# Patient Record
Sex: MALE | Race: WHITE | Employment: OTHER | URBAN - METROPOLITAN AREA
[De-identification: names, ages, dates, MRNs, and addresses within clinical notes are randomized per-mention and may not be internally consistent; named-entity substitution may affect disease eponyms.]

---

## 2017-11-08 ENCOUNTER — HOSPITAL ENCOUNTER (OUTPATIENT)
Dept: GENERAL RADIOLOGY | Age: 82
Discharge: HOME | End: 2017-11-08
Payer: MEDICARE

## 2017-11-08 DIAGNOSIS — M54.50 LOW BACK PAIN: ICD-10-CM

## 2017-11-08 PROCEDURE — 72100 X-RAY EXAM L-S SPINE 2/3 VWS: CPT

## 2017-11-15 ENCOUNTER — HOSPITAL ENCOUNTER (OUTPATIENT)
Dept: CT IMAGING | Age: 82
Discharge: HOME | End: 2017-11-15
Attending: NURSE PRACTITIONER
Payer: MEDICARE

## 2017-11-15 DIAGNOSIS — M54.9 BACK PAIN: ICD-10-CM

## 2017-11-15 PROCEDURE — 72131 CT LUMBAR SPINE W/O DYE: CPT

## 2017-11-29 ENCOUNTER — HOSPITAL ENCOUNTER (OUTPATIENT)
Dept: PHYSICAL THERAPY | Age: 82
Discharge: HOME | End: 2017-11-29
Payer: MEDICARE

## 2017-11-29 PROCEDURE — 97530 THERAPEUTIC ACTIVITIES: CPT

## 2017-11-29 PROCEDURE — 97163 PT EVAL HIGH COMPLEX 45 MIN: CPT

## 2017-11-29 PROCEDURE — G8990 OTHER PT/OT CURRENT STATUS: HCPCS

## 2017-11-29 PROCEDURE — G8991 OTHER PT/OT GOAL STATUS: HCPCS

## 2017-11-29 NOTE — PROGRESS NOTES
PT DAILY TREATMENT NOTE     Patient Name: Miguel Angel Eid  Date:2017  : 1923  [x]  Patient  Verified  Payor: [place] MEDICARE / Plan: [place] MEDICARE PART A & B / Product Type: Medicare /    In time: 1200  Out time:1300  Total Treatment Time (min): 60    Medicare/Medicaid Total Timed Codes (min): 15  1:1 Treatment Time (min): 15     Visit #: 1 of 12    Treatment Area: Low back pain [M54.5]    SUBJECTIVE  Any medication changes, allergies to medications, adverse drug reactions, diagnosis change, or new procedure performed?: [x] No    [] Yes (see summary sheet for update)  Subjective functional status/changes:       Chief Complaint: 81 yo WM with CC low back pain X 3 wks s/p fall. Bent over to  milk container, and fell over. Denies any sort of vertigo, hypotensive light-headed. Presents with rollator, accompanied by daughter-in-law who cares for pt and pt's wife. Pt is legally blind and very [place] but AO x 3 when spoken to loudly and follows 1 and 2 step commands without difficulty, apx 50% with 3 step commands. Mechanism of injury: fall    Symptoms:  Pain rating (0-10): Today: 5   Best: 5   Worst: 7   [] Paresthesias: denies     [] Constant:   [] Intermittent:     Aggravated by:   [] Bending [] Sitting [x] Standing [x] Walking   [] Moving [] Cough [] Sneeze [] Valsalva   [x] AM  [] PM  Lying:  [] sup   [] pro   [] sidelying   [] Other:     Eased by:    [] Bending [x] Sitting [] Standing [] Walking   [] Moving [] AM  [] PM  Lying: [] sup  [] pro  [] sidelying   [] Other:    General Health:  Red Flags Indicated? [] Yes    [x] No  [] Yes [] No Recent weight change (If yes, due to dieting?  [] Yes  [] No)   [] Yes [] No Weakness in legs during walking  [] Yes [] No Unremitting pain at night  [] Yes [] No Abdominal pain or problems  [] Yes [] No Rectal bleeding  [] Yes [] No Feet more cold or painful in cold weather  [] Yes [] No Menstrual irregularities  [] Yes [] No Blood or pain with urination  [] Yes [] No Dysfunction of bowel or bladder  [] Yes [] No Recent illness within past 3 weeks (i.e, cold, flu)  [] Yes [] No Numbness/tingling in buttock/genitalia region     PMH/PSH: see record    Diagnostic Tests: [] X-rays     [] MRI  [x] CT       [] Other:  Results: + nondisplaced fxs of L 12th post rib, L1-l4 transverse processes. Myofascial paramedian soft tissue lesion from T12-L2: dimensions 8.4 x 3.3 x 7 cm. Functional status:  Occupation/ hobbies: retired. Regularly goes to group exercise, single wt lifting, and swimming in pool   Prior level of function:    independent with ADLs   sleep for 7 hours/night without disruption due to pain   participate in personal fitness program 4-5 x/wk for apx 60 min/session. Present functional limitations:    unable to reach overhead without pain   unable to walk without pain   unable to dress without pain   unable to participate in personal fitness program without pain   difficulty sleeping   Pain with bed mobility   Pain with sit<>stand Xfers. What position do you sleep in? Supine + sidelying    OBJECTIVE  FOTO:    Initial Evaluation score: 28   Predicted DC score:  48     Posture:  Head  [] normal         [x] forward            [] lateral shift         [] sidebend/tilt   Cervical lordosis  [] normal         [] increased         [x] decreased   Thoracic kyphosis  [] normal         [x] increased         [] decreased   Lumbar lordosis  [] normal         [] increased         [x] decreased     Palpation  [] Min  [x] Mod  [] Severe    Location: paraspinals  [] Min  [x] Mod  [] Severe    Location: T-L spine midline  [] Min  [x] Mod  [] Severe    Location: soft tissue protrusion due to myofascial tear    Swollen nodule on back: SUP-INF= 10.5 cm. Med-Lat = 11 cm. Largest diagonal = 14 cm. ** Monitor for progression as this is larger than dimensions noted on CT scan.     Trunk  AROM % Pain   Flexion 50 [x]   Extension 20 [x]   Rotation - L 40 [x] Rotation - R 40 [x]   Sidebending - L 70 [x]   Sidebending - R 70 [x]       Trunk MMT Pain   Flexion 2 [x]   Extension 2 [x]   Rotation - L 2 [x]   Rotation - R 2 [x]   Sidebending - L -- [x]   Sidebending - R -- [x]       Neurological Screen:     Myotomes Left Right Pain   Hip flexion  (L2) 4 4 []   Knee extension  (L3) 4 4 []   Ankle dorsiflexion  (L4) 4 4 []   Great toe extension  (L5) 4 4 []   Ankle plantarflexion  (S1) 4 4 []   Knee flexion  (S2) 4 4 []       Sensation  [x] normal         [] increased         [] decreased   Reflexes:    Patellar (L3-4) - L  [] 0      [x] 1      [] 2       [] 3         [] 4     Patellar - R  [] 0      [x] 1      [] 2       [] 3         [] 4     Achilles (S1-2) - L  [] 0      [] 1      [] 2       [] 3         [] 4     Achilles - R  [] 0      [] 1      [] 2       [] 3         [] 4         Special Tests: deferred due to pain and + CT  Bed Mobility and Xfers: ModA  Gait: [place] with rollator. TODAY'S TREATMENT:    Therapeutic Procedures:  Min Procedure Specifics + Rationale    [] Therapeutic Exercise   []  See Flowsheet   Rationale:    15 [x] Therapeutic Activity   [x]  See Flowsheet  Rationale: Increase ROM, strength, and balance to increase safety with bed mobility and Xfers    [] Neuromuscular Re-ed   []  See Flowsheet  Rationale:     []  Manual Therapy     [] STM/DTM     [] IASTM     [] Scar Massage  [] X-friction       [] PNF         [] PROM  [] Soft tissue mobz:   [] Joint mobz:   Rationale:     []  Gait Training     [x]  Gait belt needed [] Treadmill: Speed: ___. Grade: ___. Time: ___  [] Gym: Distance: ___   Device:  [] SC [] QC [] AC   [] FC   [] SW   [] RW   Assist:   [] SB [] CG  [] Min  [] Mod [] Max  Rationale:     [x]  Patient Education [x] Issued HEP    [] Progressed/Changed HEP based on:   [] positioning   [] body mechanics     [] transfers      [] heat/ice application       Other Objective/Functional Measures:  See intake/chart.      Pain Level (0-10 scale) post treatment: 5    ASSESSMENT/Changes in Function: Pt with significantly impaired ROM, strength, balance, functional mobility and overall functional capacity. Eval Complexity:   History: HIGH Complexity :3+ comorbidities / personal factors will impact the outcome/ POC   Exam:HIGH Complexity : 4+ Standardized tests and measures addressing body structure, function, activity limitation and / or participation in recreation    Presentation: HIGH Complexity : Unstable and unpredictable characteristics   Clinical Decision Making:HIGH Complexity : FOTO score of 1- 25   Overall Complexity:HIGH     Patient will continue to benefit from skilled PT services to modify and progress therapeutic interventions, address functional mobility deficits, address ROM deficits, address strength deficits, analyze and address soft tissue restrictions, analyze and cue movement patterns, analyze and modify body mechanics/ergonomics, assess and modify postural abnormalities, address imbalance/dizziness and instruct in home and community integration to attain remaining goals. [x]  See Plan of Care  []  See Progress Note/ Recertification  []  See Discharge Summary         Progress towards goals / Updated goals:  STGs- 4 wks - 12/27/17  1. Pt and caregiver will be safe and independent with HEP for participation in his rehabilitation. 2. Pt will perform bed mobility with Jose Carlos  3. Pt will perform sit<>stand and stand<>pivot Xfers with Jose Carlos  LTGs- 12 wks - 2/21/18  1. Pt will be SBA with rollator to return to personal exercise program  2. Pt will be safe and indep with bed mobility to assist in his own care. 3. Pt will be safe and indep with Xfers to assist in his own care. 4. Pt will score > 48 on FOTO functional status survey representing overall functional improvements.     PLAN  []  Upgrade activities as tolerated     [x]  Continue plan of care  []  Update interventions per flow sheet       []  Discharge due to:_  []  Other:_ Therapist:  Jose Johnson, PT Date:  11/29/2017  Time: 12:12 PM    Future Appointments  Date Time Provider Medhat Portillo   3/22/2018 11:00 AM [facility] NURSE [facility] NICOLETTE HART   9/19/2018 11:15 AM Brunilda Manrique MD [address] [place] [address]

## 2017-11-29 NOTE — PROGRESS NOTES
[address] [facility] PHYSICAL THERAPY  [address] [facility] [address], [address]  Phone: [phone]  Fax: [phone] / [address] [facility]  Patient Name: Renny Tyler : 1923   Medical   Diagnosis: Low back pain [M54.5] Treatment Diagnosis: LBP. Difficulty walking. Onset Date: 17     Referral Source: Shereen Milton MD Start of Care [facility]): 2017   Prior Hospitalization: See medical history Provider #: [id number]   Prior Level of Function: Modified independent with rollator. Gym program at [facility] 4x/wk. Comorbidities: Fx, visually impaired, [place], OA, [place] , dowager's hump, COPD, glaucoma   Medications: Verified on Patient Summary List     The Plan of Care and following information is based on the information from the initial evaluation. SUBJECTIVE  81 yo WM with CC low back pain X 3 wks s/p fall. Bent over to  milk container, and fell over. Denies any sort of vertigo, hypotensive light-headed. Presents with rollator, accompanied by daughter-in-law who cares for pt and pt's wife. Pt is legally blind and very [place] but AO x 3 when spoken to loudly and follows 1 and 2 step commands without difficulty, apx 50% with 3 step commands.       OBJECTIVE  Swollen nodule on back: SUP-INF= 10.5 cm. Med-Lat = 11 cm.  Largest diagonal = 14 cm. ** Monitor for progression as this is larger than dimensions noted on CT scan.     Trunk  AROM % Pain   Flexion 50 [x]   Extension 20 [x]   Rotation - L 40 [x]   Rotation - R 40 [x]   Sidebending - L 70 [x]   Sidebending - R 70 [x]         Trunk MMT Pain   Flexion 2 [x]   Extension 2 [x]   Rotation - L 2 [x]   Rotation - R 2 [x]   Sidebending - L -- [x]   Sidebending - R -- [x]         Neurological Screen:      Myotomes Left Right Pain   Hip flexion  (L2) 3+ 3+ [x]   Knee extension  (L3) 4 4 []   Ankle dorsiflexion  (L4) 4 4 []   Great toe extension  (L5) 4 4 []   Ankle plantarflexion  (S1) 4 4 []   Knee flexion  (S2) 4 4 []        ASSESSMENT+ PLAN  Pt presents with decreased ROM, strength, functional mobility, functional capacity, loss of independence with basic ADLs and self-care. Eval Complexity:   History: HIGH Complexity :3+ comorbidities / personal factors will impact the outcome/ POC   Exam:HIGH Complexity : 4+ Standardized tests and measures addressing body structure, function, activity limitation and / or participation in recreation    Presentation: HIGH Complexity : Unstable and unpredictable characteristics   Clinical Decision Making:HIGH Complexity : FOTO score of 1- 25   Overall Complexity:HIGH     Problem List: pain affecting function, decrease ROM, decrease strength, impaired gait/ balance, decrease ADL/ functional abilitiies, decrease activity tolerance and decrease flexibility/ joint mobility   Treatment Plan may include any combination of the following: Therapeutic exercise, Therapeutic activities, Neuromuscular re-education, Physical agent/modality, Gait/balance training, Manual therapy, Aquatic therapy, Patient education, Self Care training, Functional mobility training, Home safety training and Stair training  Patient / Family readiness to learn indicated by: asking questions, trying to perform skills and interest  Persons(s) to be included in education: patient (P) and family support person (FSP);list daughter-in-law, nursing assistant  Barriers to Learning/Limitations: yes;  sensory deficits-vision/hearing/speech  Measures taken: Speak plainly and loudly. Written instructions and pictures provided to caregiver. Patient Goal (s): \"less pain, able to return to exercise class\"   Patient self reported health status: fair  Rehabilitation Potential: fair  GOALS  STGs- 4 wks - 12/27/17  1. Pt and caregiver will be safe and independent with HEP for participation in his rehabilitation. 2. Pt will perform bed mobility with Jose Carlos  3.  Pt will perform sit<>stand and stand<>pivot Xfers with Jose Carlos  LTGs- 12 wks - 2/21/18  1. Pt will be SBA with rollator to return to personal exercise program  2. Pt will be safe and indep with bed mobility to assist in his own care. 3. Pt will be safe and indep with Xfers to assist in his own care. 4. Pt will score > 48 on FOTO functional status survey representing overall functional improvements.       Frequency / Duration: Patient to be seen  2-3  times per week for 4  weeks:  Patient / Caregiver education and instruction: self care, activity modification and exercises  G-Codes (GP): Other Primary Functional Limitation:  Current  CL= 60-79%   Goal  CK= 40-59%. The severity rating is based on the FOTO Score. Therapist Signature:  Gideon Nicholas May  Date:  75/28/4444   Certification Period:   11/29/2017 - 2/28/18  Time:  88:85 PM      I certify that the above Physical Therapy Services are being furnished while the patient is under my care. I agree with the treatment plan and certify that this therapy is necessary. Physician Signature:         Date:      Time: _____  Please sign and return to [employer] or you may fax the signed copy to [phone]. Thank you.

## 2017-12-04 ENCOUNTER — HOSPITAL ENCOUNTER (OUTPATIENT)
Dept: PHYSICAL THERAPY | Age: 82
Discharge: HOME | End: 2017-12-04
Payer: MEDICARE

## 2017-12-04 PROCEDURE — 97116 GAIT TRAINING THERAPY: CPT

## 2017-12-04 PROCEDURE — 97530 THERAPEUTIC ACTIVITIES: CPT

## 2017-12-04 NOTE — PROGRESS NOTES
PT DAILY TREATMENT NOTE     Patient Name: Héctor Anderson  Date:2017  : 1923  [x]  Patient  Verified  Payor: [place] MEDICARE / Plan: [place] MEDICARE PART A & B / Product Type: Medicare /    In time:1030  Out time:1130  Total Treatment Time (min): 60    Medicare/Medicaid Total Timed Codes (min): 45  1:1 Treatment Time (min): 45     Visit #: 2 of 12    Treatment Area: Low back pain [M54.5]    SUBJECTIVE  Pain Level (0-10 scale): 0  Any medication changes, allergies to medications, adverse drug reactions, diagnosis change, or new procedure performed?: [x] No    [] Yes (see summary sheet for update)  Subjective functional status/changes:   [] No changes reported  Pt reports 0 pain, but makes various winces, grunts, and other nonverbal pain noises/expressions. OBJECTIVE  Modality rationale: decrease pain and increase tissue extensibility to improve the patients ability to perform sit<>stand transfers without pain increase or compensations.    Min Type Additional Details    [] E-Stim:       [] Att                 [] Unatt                                [] Premod         [] IFC                          [] NMES           [] TENS instruct                          [] Other: Dose:  ___  Hz X ___ V  Location:  [] supine              [] prone  [] legs elevatedv   [] legs flat  []w/heat  []w/ice  []w/US     []  Traction:   [] Cervical          []Lumbar                          [] Intermitent      []Continuous Ramp/ hold/ lbs:     [] supine              [] prone  [] legs elevated    [] legs flat    []  US:           []Continuous      [] Pulsed                         [] 1 MHz             [] 3MHz Location:    W/cm2:      []  Iontophoresis with dexamethasone                         [] 40 mA             [] 80 mA [] In clinic  [] Take home patch   10 [x]  Heat          [x] pre-ABE          [] post-ABE Location:  L-T- spine  [] supine              [] prone  [] legs elevated    [] legs flat    []  Cold Pack  [] pre-ABE [] post-ABE  []  Ice massage Location:    [] supine              [] prone  [] legs elevated    [] legs flat    []  Vasopneumatic Device Pressure:       [] lo [] med [] hi   Temperature: [] lo [] med [] hi   [] Skin assessment post-treatment:  []intact []redness- no adverse reaction       []redness  adverse reaction:        45  (30) min Therapeutic Activity [x]  See Flowsheet    Rationale: increase ROM, increase strength, improve coordination and improve balance to improve the patients ability to perform sit<>stand transfers without pain increase or compensations. 15 min Gait Training [] Treadmill: Speed: ___. Grade: ___. Time: ___  [] Gym: Distance: 2 short laps (around equip) x 2  Dev: [] None [] SC [] QC [] AC  [] FC  [] SW [x] RW   Assist:  [] None [] SB [x] CG [] Min [] Mod [] Max   Rationale: increase ROM, increase tissue extensibility and increase balance to improve functional mobility and safe use of walker. Patient Education [x] Review HEP    [] Progressed/Changed HEP based on:   [] positioning   [] body mechanics     [] transfers      [] heat/ice application       Other Objective/Functional Measures:  Patient requires TC/VC to perform therapeutic exercises correctly. Pain Level (0-10 scale) post treatment: 0    ASSESSMENT/Changes in Function: Patient tolerated treatment well. Providing good effort. Patient will continue to benefit from skilled PT services to modify and progress therapeutic interventions, address functional mobility deficits, address ROM deficits, address strength deficits, analyze and address soft tissue restrictions, analyze and cue movement patterns, analyze and modify body mechanics/ergonomics, assess and modify postural abnormalities, address imbalance/dizziness and instruct in home and community integration to attain remaining goals.      []  See Plan of Care  []  See Progress Note/ Recertification  []  See Discharge Summary         Progress towards goals / Updated goals:  STGs- 4 wks - 12/27/17  1. Pt and caregiver will be safe and independent with HEP for participation in his rehabilitation. - progressing. 2. Pt will perform bed mobility with Jose Carlos   3. Pt will perform sit<>stand and stand<>pivot Xfers with Jose Carlos  LTGs- 12 wks - 2/21/18  1. Pt will be SBA with rollator to return to personal exercise program  2. Pt will be safe and indep with bed mobility to assist in his own care. 3. Pt will be safe and indep with Xfers to assist in his own care.   4. Pt will score > 48 on FOTO functional status survey representing overall functional improvements.     PLAN  [x]  Continue plan of care  []  Upgrade activities as tolerated       []  Update interventions per flow sheet       []  Discharge due to:_  []  Other:_      Therapist:  Reyes Gonzalez PT  Date:  12/4/2017  Time: 10:00 AM    Future Appointments  Date Time Provider Medhat Portillo   12/4/2017 10:30 AM Lakisha Painter [facility]   12/6/2017 11:00 AM Heike Quiñones PT [facility]   12/8/2017 11:00 AM Lakisha Painter [facility]   12/11/2017 1:00 PM [address] [place] [address]   12/13/2017 2:00 PM Heike Quiñones PT [facility]   12/15/2017 11:00 AM Lakisha Painter [facility]   12/18/2017 2:00 PM Maki [facility]   12/20/2017 2:00 PM [address] [place] [address]   12/27/2017 11:00 AM Heike Quiñones PT [facility]   12/29/2017 11:00 AM Lakisha Painter [facility]   3/22/2018 11:00 AM [facility] NURSE [facility] NICOLETTE SCHED   9/19/2018 11:15 AM Wayne Londono MD [address]

## 2017-12-06 ENCOUNTER — HOSPITAL ENCOUNTER (OUTPATIENT)
Dept: PHYSICAL THERAPY | Age: 82
Discharge: HOME | End: 2017-12-06
Payer: MEDICARE

## 2017-12-06 PROCEDURE — 97530 THERAPEUTIC ACTIVITIES: CPT

## 2017-12-06 PROCEDURE — 97110 THERAPEUTIC EXERCISES: CPT

## 2017-12-06 NOTE — PROGRESS NOTES
PHYSICAL THERAPY - DAILY TREATMENT NOTE    Patient Name: Hardy Andrews        Date: 2017  : 1923   yes Patient  Verified  Visit #:   3   of   12  Insurance: Payor: [address] [place] [address] / Plan: [place] MEDICARE PART A & B / Product Type: Medicare /      In time:  Out time: 78   Total Treatment Time: 66     Medicare Time Tracking (below)   Total Timed Codes (min):  56 1:1 Treatment Time:  56     TREATMENT AREA =  Low back pain [M54.5]    SUBJECTIVE  Pain Level (on 0 to 10 scale):  0  / 10   Medication Changes/New allergies or changes in medical history, any new surgeries or procedures?    no  If yes, update Summary List   Subjective Functional Status/Changes:  []  No changes reported     \"I am not hurting now.  I feel it in my back sometimes\"          OBJECTIVE  Modalities Rationale:     decrease pain to improve patient's ability to perform ADLs/ bending/stooping/ lifting/prolong sitting, stding and amb/ stairs with ease    min [] Estim, type/location:                                      []  att     []  unatt     []  w/US     []  w/ice    []  w/heat    min []  Mechanical Traction: type/lbs                   []  pro   []  sup   []  int   []  cont    []  before manual    []  after manual    min []  Ultrasound, settings/location:      min []  Iontophoresis w/ dexamethasone, location:                                               []  take home patch       []  in clinic   10 min []  Ice     [x]  Heat    location/position: Supine with wedge under B LEs      min []  Vasopneumatic Device, press/temp:     min []  Other:    [x] Skin assessment post-treatment (if applicable):    [x]  intact    []  redness- no adverse reaction     []redness  adverse reaction:        31 min Therapeutic Exercise:  [x]  See flow sheet   Rationale:      increase ROM, increase strength, improve coordination, improve balance and increase proprioception to improve the patients ability to perform ADLs/ bending/stooping/ lifting/prolong sitting, stding and amb/ stairs with ease          24 min Therapeutic Activity: MIP  sit <>std without B UE  amb x 250' x2 with SBA and rollator   Rationale:    increase ROM, increase strength, improve coordination, improve balance and increase proprioception to improve the patients ability to perform ADLs/ bending/stooping/ lifting/prolong sitting, stding and amb/ stairs with ease        min Patient Education:  yes  Reviewed HEP   []  Progressed/Changed HEP based on:  Pt ed on importance and benefits of compliance with HEP, core strength/stability and proper posture; pt verbalized understanding         Other Objective/Functional Measures:    VCs + demo to perform proper technique for TE  Initiated MIP and sit <>std transfers without c/o p!  initiated 1/2 SL <>sit for bed mobility   c/o L/s p! after TE; applied mHP      Post Treatment Pain Level (on 0 to 10) scale:   0  / 10     ASSESSMENT  Assessment/Changes in Function:     Progressed there-ex without c/o increase p!  demos fwd trunk flex with gait; improved with CCs and tCs  decrease step clearance, improved with VCs     []  See Progress Note/Recertification   Patient will continue to benefit from skilled PT services to modify and progress therapeutic interventions, address functional mobility deficits, address ROM deficits, address strength deficits, analyze and address soft tissue restrictions, analyze and cue movement patterns, analyze and modify body mechanics/ergonomics, assess and modify postural abnormalities and instruct in home and community integration to attain remaining goals. Progress toward goals / Updated goals:    GOALS  STGs- 4 wks - 12/27/17  1. Pt and caregiver will be safe and independent with HEP for participation in his rehabilitation. 2. Pt will perform bed mobility with Jose Carlos  3. Pt will perform sit<>stand and stand<>pivot Xfers with Jose Carlos  LTGs- 12 wks - 2/21/18  1. Pt will be SBA with rollator to return to personal exercise program  2. Pt will be safe and indep with bed mobility to assist in his own care. 3. Pt will be safe and indep with Xfers to assist in his own care. 4. Pt will score > 48 on FOTO functional status survey representing overall functional improvements.      PLAN  []  Upgrade activities as tolerated yes Continue plan of care   []  Discharge due to :    []  Other:      Therapist: Kerry Candelaria PTA    Date: 12/6/2017 Time: 1:48 PM     Future Appointments  Date Time Provider Medhat Portillo   12/8/2017 11:00 AM Pierce Dubin [facility]   12/11/2017 1:00 PM [address] [place] [address]   12/13/2017 2:00 PM Adrianna Peres PT [facility]   12/15/2017 11:00 AM Pierce Dubin [facility]   12/18/2017 2:00 PM [address] [place] [address]   12/20/2017 2:00 PM [address] [place] [address]   12/27/2017 11:00 AM Adrianna Peres PT [facility]   12/29/2017 11:00 AM Jareth Dubin [facility]   3/22/2018 11:00 AM [facility] NURSE [facility] NICOLETTE HART   9/19/2018 11:15 AM Leopold Nickels, MD [address] [facility]

## 2017-12-08 ENCOUNTER — HOSPITAL ENCOUNTER (OUTPATIENT)
Dept: PHYSICAL THERAPY | Age: 82
Discharge: HOME | End: 2017-12-08
Payer: MEDICARE

## 2017-12-08 PROCEDURE — 97140 MANUAL THERAPY 1/> REGIONS: CPT

## 2017-12-08 PROCEDURE — 97110 THERAPEUTIC EXERCISES: CPT

## 2017-12-08 PROCEDURE — 97116 GAIT TRAINING THERAPY: CPT

## 2017-12-08 NOTE — PROGRESS NOTES
PT DAILY TREATMENT NOTE     Patient Name: Abhinav Adjutant  Date:2017  : 1923   [x]  Patient  Verified  Payor: [place] MEDICARE / Plan: [place] MEDICARE PART A & B / Product Type: Medicare /    In time:1100  Out time:1140  Total Treatment Time (min): 40    Medicare/Medicaid Total Timed Codes (min): 40  1:1 Treatment Time (min): 40     Visit #: 4 of 12    Treatment Area: Low back pain [M54.5]    SUBJECTIVE  Pain Level (0-10 scale): 0  Any medication changes, allergies to medications, adverse drug reactions, diagnosis change, or new procedure performed?: [x] No    [] Yes (see summary sheet for update)  Subjective functional status/changes:   [] No changes reported  Caregiver reports he's doing better with RW safety at home. Pt generally responds \"I'm fine/ It's fine\" to most questions. OBJECTIVE  Modality rationale: decrease inflammation, decrease pain, increase tissue extensibility and increase muscle contraction/control to improve the patients ability to perform sit<>stand transfers without pain increase or compensations.    Min Type Additional Details    [] E-Stim:       [] Att                 [] Unatt                                [] Premod         [] IFC                          [] NMES           [] TENS instruct                          [] Other: Dose:  ___  Hz X ___ V  Location:  [] supine              [] prone  [] legs elevatedv   [] legs flat  []w/heat  []w/ice  []w/US     []  Traction:   [] Cervical          []Lumbar                          [] Intermitent      []Continuous Ramp/ hold/ lbs:     [] supine              [] prone  [] legs elevated    [] legs flat    []  US:           []Continuous      [] Pulsed                         [] 1 MHz             [] 3MHz Location:    W/cm2:      []  Iontophoresis with dexamethasone                         [] 40 mA             [] 80 mA [] In clinic  [] Take home patch   10 [x]  Heat          [x] beginning of w/ ABE          [] post-ABE Location: Midback [] supine              [] prone  [] legs H/L            [] legs flat    []  Cold Pack  [] pre-ABE          [] post-ABE  []  Ice massage Location:    [] supine              [] prone  [] legs elevated    [] legs flat    []  Vasopneumatic Device Pressure:       [] lo [] med [] hi   Temperature: [] lo [] med [] hi   [] Skin assessment post-treatment:  []intact []redness- no adverse reaction       []redness  adverse reaction:     22 min Therapeutic Exercise [x]  See Flowsheet    Rationale: increase ROM and increase strength to improve the patients ability to perform bed mobility and Xfers. 8 min Manual Therapy [] STM/DTM     [] IASTM     [] Scar Massage  [] X-friction       [] PNF         [] PROM  [] Soft tissue mobz:   [] Joint mobz:    Rationale: decrease pain, increase tissue extensibility and decrease paraspinal protrusion to  improve the patients ability to perform bed mobility and Xfers. 10 min Gait Training [] Treadmill: Speed: ___. Grade: ___. Time: ___  [] Gym: Distance: ___   Dev: [] None [] SC [] QC [] AC  [] FC  [] SW [] RW   Assist:  [] None [] SB [] CG [] Min [] Mod [] Max   Rationale: increase step length, upright posture, step height, and situational awareness to increase safety and independence with functional mobility. Patient Education [x] Review HEP - with caregiver + pt. Issued YTB. [] Progressed/Changed HEP based on:   [] positioning   [] body mechanics     [] transfers      [] heat/ice application       Other Objective/Functional Measures:  Patient requires TC/VC to perform therapeutic exercises correctly. Pain Level (0-10 scale) post treatment: 0    ASSESSMENT/Changes in Function: Patient tolerated treatment well. Providing good effort.      Patient will continue to benefit from skilled PT services to modify and progress therapeutic interventions, address functional mobility deficits, address ROM deficits, address strength deficits, analyze and address soft tissue restrictions, analyze and cue movement patterns, analyze and modify body mechanics/ergonomics, assess and modify postural abnormalities, address imbalance/dizziness and instruct in home and community integration to attain remaining goals. []  See Plan of Care  []  See Progress Note/ Recertification  []  See Discharge Summary         Progress towards goals / Updated goals:  STGs- 4 wks - 12/27/17  1. Pt and caregiver will be safe and independent with HEP for participation in his rehabilitation. - progressing. 2. Pt will perform bed mobility with Jose Carlos. 12/8- min-modA  3. Pt will perform sit<>stand and stand<>pivot Xfers with Jose Carlos - 12/8- MET- reassess  LTGs- 12 wks - 2/21/18  1. Pt will be SBA with rollator to return to personal exercise program  2. Pt will be safe and indep with bed mobility to assist in his own care. 3. Pt will be safe and indep with Xfers to assist in his own care.   4. Pt will score > 48 on FOTO functional status survey representing overall functional improvements.       PLAN  [x]  Continue plan of care  []  Upgrade activities as tolerated       []  Update interventions per flow sheet       []  Discharge due to:_  []  Other:_      Therapist:  Ashley Espino PT  Date:  12/8/2017  Time: 10:00 AM    Future Appointments  Date Time Provider Medhat Portillo   12/8/2017 11:00 AM [facility]   12/11/2017 1:00 PM [address] [place] [address]   12/13/2017 2:00 PM Selam Quintana PT [facility]   [facility]2017 [facility]   12/18/2017 2:00 PM [address] [place] [address]   12/20/2017 2:00 PM [address] [place] [address]   12/27/2017 11:00 AM Selam Quintana PT [facility]   12/29/2017 11:00 AM [facility]   3/22/2018 11:00 AM [facility] NURSE [facility] NICOLETTE HART   9/19/2018 11:15 AM Riley Florentino MD [facility]

## 2017-12-11 ENCOUNTER — HOSPITAL ENCOUNTER (OUTPATIENT)
Dept: PHYSICAL THERAPY | Age: 82
Discharge: HOME | End: 2017-12-11
Payer: MEDICARE

## 2017-12-11 PROCEDURE — 97140 MANUAL THERAPY 1/> REGIONS: CPT

## 2017-12-11 PROCEDURE — 97530 THERAPEUTIC ACTIVITIES: CPT

## 2017-12-11 NOTE — PROGRESS NOTES
PHYSICAL THERAPY - DAILY TREATMENT NOTE    Patient Name: Soumya Mejia        Date: 2017  : 1923   YES Patient  Verified  Visit #:     Insurance: Payor: Reinaldo Singh / Plan: [place] MEDICARE PART A & B / Product Type: Medicare /      In time: 1:00 Out time: 1:40   Total Treatment Time: 40     Medicare Time Tracking (below)   Total Timed Codes (min):  40 1:1 Treatment Time:  30     TREATMENT AREA =  Low back pain [M54.5]    SUBJECTIVE    Pain Level (on 0 to 10 scale):  0  / 10   Medication Changes/New allergies or changes in medical history, any new surgeries or procedures? NO    If yes, update Summary List   Subjective Functional Status/Changes:  []  No changes reported     Pt's caregiver states that she didn't know what ex's the yellow band was for, the caregiver from last time did not tell her. Pt's caregiver states that the bump on his back seems to be a little better than it was initially. OBJECTIVE    22  (12) min Therapeutic Exercise:  [x]  See flow sheet   Rationale:    Increase core strength/stabilization, ROM/flexibility to improve pt's ability to perform ADL's with increased ease and less pain to promote increased activity tolerance. 10 min Therapeutic Activity: sit<>stand   Bed mobility including sup<>sit and sup<>S/L   Rationale:  improve transfers and functional mobility    8 min Manual Therapy: IASTM to T/S paraspinals   Rationale:decrease pain, increase tissue extensibility and decrease trigger points to improve patient's ability to perform ADL's with greater ease and less pain. min Patient Education:  YES  Reviewed HEP   []  Progressed/Changed HEP based on: Other Objective/Functional Measures:    Reviewed form for seated tband ex with pt and pt's caregiver. Pt required min A for sup>S/L and mod A for S/L >supine.    Min A for trunk and max A for LE's with sit>supine and max A for trunk and mod A for LE's with sup>sit    Pt with c/o B hip pain>LBP with bed mobility. Post Treatment Pain Level (on 0 to 10) scale:   0  / 10     ASSESSMENT    Assessment/Changes in Function:     Pt able to perform ther-ex without c/o increased LBP, but continues with limited functional mobility as evidenced by difficulty with transfers and bed mobility. []  See Progress Note/Recertification   Patient will continue to benefit from skilled PT services to modify and progress therapeutic interventions, address functional mobility deficits, address ROM deficits, address strength deficits, analyze and address soft tissue restrictions, analyze and cue movement patterns and instruct in home and community integration to attain remaining goals. Progress toward goals / Updated goals:    STGs- 4 wks - 12/27/17  1. Pt and caregiver will be safe and independent with HEP for participation in his rehabilitation. Educated pt and caregiver on new tband ex for HEP  2. Pt will perform bed mobility with Jose Carlos min    3. Pt will perform sit<>stand and stand<>pivot Xfers with Jose Carlos cont practicing safety for transfers  LTGs- 12 wks - 2/21/18  1. Pt will be SBA with rollator to return to personal exercise program  2. Pt will be safe and indep with bed mobility to assist in his own care. 3. Pt will be safe and indep with Xfers to assist in his own care.   4. Pt will score > 48 on FOTO functional status survey representing overall functional improvements.        PLAN    []  Upgrade activities as tolerated YES Continue plan of care   []  Discharge due to :    []  Other:      Therapist: Domonique Valera PTA    Date: 12/11/2017 Time: 2:56 PM     Future Appointments  Date Time Provider Medhat Portillo   12/13/2017 2:00 PM Margarita Ramirez PT [facility]   12/15/2017 11:00 AM Amber Johnson [facility]   12/18/2017 2:00 PM eJff Brownlee [facility]   [facility]2017 [facility]   12/27/2017 11:00 AM Margarita Ramirez PT [facility]   12/29/2017 11:00 AM Amber Reyna May [facility]   3/22/2018 11:00 AM [facility] NURSE [facility]   9/19/2018 11:15 AM Todd Palmer MD [id number]

## 2017-12-13 ENCOUNTER — HOSPITAL ENCOUNTER (OUTPATIENT)
Dept: PHYSICAL THERAPY | Age: 82
Discharge: HOME | End: 2017-12-13
Payer: MEDICARE

## 2017-12-13 PROCEDURE — 97530 THERAPEUTIC ACTIVITIES: CPT

## 2017-12-13 PROCEDURE — 97110 THERAPEUTIC EXERCISES: CPT

## 2017-12-13 NOTE — PROGRESS NOTES
PHYSICAL THERAPY - DAILY TREATMENT NOTE    Patient Name: Colt Carty        Date: 2017  : 1923   YES Patient  Verified  Visit #:     Insurance: Payor: Wilbur Holt / Plan: [place] MEDICARE PART A & B / Product Type: Medicare /      In time: 2:05 Out time: 3:00   Total Treatment Time: 55     Medicare Time Tracking (below)   Total Timed Codes (min):  55 1:1 Treatment Time:  55     TREATMENT AREA =  Low back pain [M54.5]  SUBJECTIVE    Pain Level (on 0 to 10 scale):  0  / 10   Medication Changes/New allergies or changes in medical history, any new surgeries or procedures? NO    If yes, update Summary List   Subjective Functional Status/Changes:  []  No changes reported     Pt denies pain, caregiver reports that he c/o intermittent back/hip pain at home. Caregiver reports compliance with HEP.           OBJECTIVE    40 min Therapeutic Exercise:  [x]  See flow sheet   Rationale:      increase ROM, increase strength, improve coordination, improve balance and increase proprioception to improve the patients ability to perform ADLs/IADLs, functional mobility and gait safely and independently without increased pain/symptoms     15 min Therapeutic Activity: See below   Rationale: improve functional mobility and gait to improve the patient's ability to perform ADLs/IADLs, functional mobility and gait safely and independently without increased pain/symptoms       During TE min Patient Education:  YES  Reviewed HEP   []  Progressed/Changed HEP based on:   Cont HEP     Other Objective/Functional Measures:    Initiated H/L hip abd/add, clam, bridge, LTR, seated march, seated knee flex with t-band, seated HR/TR, stance EO/EC  Performed supine to sit with S, sit to supine with min A (through R sidelying), performed scooting on mat with S, performed rolling B x 5 with S and verbal cues for BIG arm reach across body with trailing arm and BIG push with trailing LE to facilitate roll  Performed sit to stand from low mat with min A, sit to stand x 5 from chair without armrests with CG to min A and verbal cues to push from chair/reach back for chair with B UE  Ambulated into/out of clinic and in clinic with rollator walker and negotiated 8 cones over 30' path (weaving) with S and verbal cues for upright posture, correct position of RW in relation to body and increased step length     Post Treatment Pain Level (on 0 to 10) scale:   0  / 10     ASSESSMENT    Assessment/Changes in Function:     Tolerated exercises without complaints     []  See Progress Note/Recertification   Patient will continue to benefit from skilled PT services to modify and progress therapeutic interventions, address functional mobility deficits, address ROM deficits, address strength deficits, analyze and address soft tissue restrictions, analyze and cue movement patterns, analyze and modify body mechanics/ergonomics, assess and modify postural abnormalities, address imbalance/dizziness and instruct in home and community integration to attain remaining goals. Progress toward goals / Updated goals:    Progressing toward goals:  1. Pt and caregiver will be safe and independent with HEP for participation in his rehabilitation. Reports compliance with HEP  2. Pt will perform bed mobility with Jose Carlos - Supine to sit with min A, rolling/scooting with S    3.  Pt will perform sit<>stand and stand<>pivot Xfers with Jose Carlos - Sit to stand with min A     PLAN    [x]  Upgrade activities as tolerated YES Continue plan of care   []  Discharge due to :    []  Other:      Therapist: Sidra Spears PT    Date: 12/13/2017 Time: 2:43 PM     Future Appointments  Date Time Provider Medhat Portillo   12/15/2017 11:00 AM Jose Magana [facility]   12/18/2017 2:00 PM [address] [place] [address]   12/20/2017 2:00 PM [address] [place] [address]   12/27/2017 11:00 AM Sidra Spears PT [facility]   12/29/2017 11:00 AM Jose Magana [facility]   3/22/2018 11:00 AM [facility] NURSE [facility] NICOLETTE HART   9/19/2018 11:15 AM Bibi Goodwin MD [address] [employer]

## 2017-12-15 ENCOUNTER — HOSPITAL ENCOUNTER (OUTPATIENT)
Dept: PHYSICAL THERAPY | Age: 82
Discharge: HOME | End: 2017-12-15
Payer: MEDICARE

## 2017-12-15 PROCEDURE — 97110 THERAPEUTIC EXERCISES: CPT

## 2017-12-15 PROCEDURE — 97140 MANUAL THERAPY 1/> REGIONS: CPT

## 2017-12-15 PROCEDURE — 97530 THERAPEUTIC ACTIVITIES: CPT

## 2017-12-15 NOTE — PROGRESS NOTES
PT DAILY TREATMENT NOTE     Patient Name: Natali Gallegos  Date:12/15/2017  : 1923  [x]  Patient  Verified  Payor: Krystina Thompson / Plan: [place] MEDICARE PART A & B / Product Type: Medicare /    In time:110  Out time:1145  Total Treatment Time (min): 45    Medicare/Medicaid Total Timed Codes (min): 45  1:1 Treatment Time (min): 45     Visit #: 7 of 12    Treatment Area: Low back pain [M54.5]    SUBJECTIVE  Pain Level (0-10 scale): 0 \"fine\"  Any medication changes, allergies to medications, adverse drug reactions, diagnosis change, or new procedure performed?: [x] No    [] Yes (see summary sheet for update)  Subjective functional status/changes:   [] No changes reported  Patient reports compliance with HEP. Caregiver states \"he likes to do things and be active\"    OBJECTIVE  Modality rationale: Held today due to pt very engaged and motivated.  Minimal facial expressions or body language indicative of pain than usual.    Min Type Additional Details    [] E-Stim:       [] Att                 [] Unatt                                [] Premod         [] IFC                          [] NMES           [] TENS instruct                          [] Other: Dose:  ___  Hz X ___ V  Location:  [] supine              [] prone  [] legs elevatedv   [] legs flat  []w/heat  []w/ice  []w/US     []  Traction:   [] Cervical          []Lumbar                          [] Intermitent      []Continuous Ramp/ hold/ lbs:     [] supine              [] prone  [] legs elevated    [] legs flat    []  US:           []Continuous      [] Pulsed                         [] 1 MHz             [] 3MHz Location:    W/cm2:      []  Iontophoresis with dexamethasone                         [] 40 mA             [] 80 mA [] In clinic  [] Take home patch    []  Heat          [] pre-ABE          [] post-ABE Location:    [] supine              [] prone  [] legs elevated    [] legs flat    []  Cold Pack  [] pre-ABE          [] post-ABE  []  Ice massage Location:    [] supine              [] prone  [] legs elevated    [] legs flat    []  Vasopneumatic Device Pressure:       [] lo [] med [] hi   Temperature: [] lo [] med [] hi   [] Skin assessment post-treatment:  []intact []redness- no adverse reaction       []redness  adverse reaction:        20 min Therapeutic Exercise [x]  See Flowsheet    Rationale: increase ROM and increase strength to improve the patients ability to perform sit<>stand transfers without pain increase or compensations. 10 min Therapeutic Activity [x]  See Flowsheet    Rationale: increase strength, improve coordination and improve balance to improve the patients ability to perform sit<>stand transfers without pain increase or compensations. 10 min Manual Therapy [x] STM/DTM     [x] IASTM     [] Scar Massage  [] X-friction       [] PNF         [] PROM  [] Soft tissue mobz:   [] Joint mobz:    Rationale: decrease pain, increase tissue extensibility, decrease edema  and increase postural awareness to increase spinal flexion to look and reach down to don/doff and tie/untie shoes without pain increase or movement compensations/dysfunction. 5 min Gait Training [] Treadmill: Speed: ___. Grade: ___. Time: ___  [x] Gym: Distance: 2 small laps apx 100'  Dev: [] None [] SC [] QC [] AC  [] FC  [] SW [x] RW   Assist:  [] None [x] SB [x] CG [] Min [] Mod [] Max   Rationale: increase ROM, increase postural awareness and increase safety to improve functional mobility to safely ambulate around obstacles in the home and in the community. Patient Education [x] Review HEP    [] Progressed/Changed HEP based on:   [] positioning   [] body mechanics     [] transfers      [] heat/ice application       Other Objective/Functional Measures:  Patient requires TC/VC to perform therapeutic exercises correctly. Pain Level (0-10 scale) post treatment: 0 \"fine\"    ASSESSMENT/Changes in Function: Paraspinal protrusion decreased post-tx.  Increased carryover today with RW and Xfer safety. Patient will continue to benefit from skilled PT services to modify and progress therapeutic interventions, address functional mobility deficits, address ROM deficits, address strength deficits, analyze and address soft tissue restrictions, analyze and cue movement patterns, analyze and modify body mechanics/ergonomics, assess and modify postural abnormalities, address imbalance/dizziness and instruct in home and community integration to attain remaining goals. []  See Plan of Care  []  See Progress Note/ Recertification  []  See Discharge Summary         Progress towards goals / Updated goals:  STGs- 4 wks - 12/27/17  1. Pt and caregiver will be safe and independent with HEP for participation in his rehabilitation. - progressing. 2. Pt will perform bed mobility with Jose Carlos. 12/15 MET  3. Pt will perform sit<>stand and stand<>pivot Xfers with Jose Carlos - 12/15- MET  LTGs- 12 wks - 2/21/18  1. Pt will be SBA with rollator to return to personal exercise program  2. Pt will be safe and indep with bed mobility to assist in his own care. 3. Pt will be safe and indep with Xfers to assist in his own care.   4. Pt will score > 48 on FOTO functional status survey representing overall functional improvements.       PLAN  [x]  Continue plan of care  []  Upgrade activities as tolerated       []  Update interventions per flow sheet       []  Discharge due to:_  []  Other:_      Therapist:  Homer Abraham PT  Date:  12/15/2017  Time: 10:01 AM    Future Appointments  Date Time Provider Medhat Portillo   12/15/2017 11:00 AM [facility]   12/18/2017 2:00 PM [address] [place] [address]   12/20/2017 2:00 PM [address] [place] [address]   12/27/2017 11:00 AM Brandon Everett PT [facility]   12/29/2017 11:00 AM [facility]   3/22/2018 11:00 AM [facility] NURSE [facility] NICOLETTE SCHED   9/19/2018 11:15 AM Amando Hall MD [address]

## 2017-12-18 ENCOUNTER — HOSPITAL ENCOUNTER (OUTPATIENT)
Dept: PHYSICAL THERAPY | Age: 82
Discharge: HOME | End: 2017-12-18
Payer: MEDICARE

## 2017-12-18 PROCEDURE — 97140 MANUAL THERAPY 1/> REGIONS: CPT

## 2017-12-18 PROCEDURE — 97110 THERAPEUTIC EXERCISES: CPT

## 2017-12-18 PROCEDURE — 97530 THERAPEUTIC ACTIVITIES: CPT

## 2017-12-18 NOTE — PROGRESS NOTES
PHYSICAL THERAPY - DAILY TREATMENT NOTE    Patient Name: Hitesh Marie        Date: 2017  : 1923   YES Patient  Verified  Visit #:     Insurance: Payor: Mary Borne / Plan: [place] MEDICARE PART A & B / Product Type: Medicare /      In time: 2:00 Out time: 2:55   Total Treatment Time: 54     Medicare Time Tracking (below)   Total Timed Codes (min):  55 1:1 Treatment Time:  55     TREATMENT AREA =  Low back pain [M54.5]    SUBJECTIVE    Pain Level (on 0 to 10 scale):  5  / 10   Medication Changes/New allergies or changes in medical history, any new surgeries or procedures? NO    If yes, update Summary List   Subjective Functional Status/Changes:  []  No changes reported     Pt c/o pain in his knees and back. \"I haven't been swimming in over a year because I have shortness of breath. \"       OBJECTIVE    35 min Therapeutic Exercise:  [x]  See flow sheet   Rationale:    Increase core strength/stabilization, ROM/flexibility to improve pt's ability to perform ADL's with increased ease and less pain to promote increased activity tolerance. 10 min Therapeutic Activity: sit<>stand without UE A from elevated surface   Bed mobility   Rationale:  improve transfers and functional mobility     10 min Manual Therapy: MFR and IASTM to L T/S paraspinals   Rationale: decrease pain, increase tissue extensibility and decrease trigger points to improve patient's ability to perform ADL's with greater ease and less pain. min Patient Education:  YES  Reviewed HEP   []  Progressed/Changed HEP based on: Other Objective/Functional Measures:    Pt performed sit<>stand from elevated surface without UE A. Pt demo's significant difficulty rolling sup>SL and requires TCing to facilitate rolling with UE reaching across body and min A. Added seated T/S rotation to L, pt without c/o. Mod VCing for posture with seated EOB ex.       Post Treatment Pain Level (on 0 to 10) scale:   0  / 10 ASSESSMENT    Assessment/Changes in Function:     Pt continues with decreased functional mobility as evidenced by difficulty with transfers and bed mobility. []  See Progress Note/Recertification   Patient will continue to benefit from skilled PT services to modify and progress therapeutic interventions, address functional mobility deficits, address ROM deficits, address strength deficits, analyze and address soft tissue restrictions, analyze and cue movement patterns, analyze and modify body mechanics/ergonomics, assess and modify postural abnormalities, address imbalance/dizziness and instruct in home and community integration to attain remaining goals. Progress toward goals / Updated goals:    STGs- 4 wks - 12/27/17  1. Pt and caregiver will be safe and independent with HEP for participation in his rehabilitation. Pt's caregiver reports compliance with HEP  2. Pt will perform bed mobility with Jose Carlos Min A and VCing for sup>SL today  3. Pt will perform sit<>stand and stand<>pivot Xfers with Jose Carlos cont transfer training  LTGs- 12 wks - 2/21/18  1. Pt will be SBA with rollator to return to personal exercise program  2. Pt will be safe and indep with bed mobility to assist in his own care. 3. Pt will be safe and indep with Xfers to assist in his own care. 4. Pt will score > 48 on FOTO functional status survey representing overall functional improvements.      PLAN    []  Upgrade activities as tolerated YES Continue plan of care   []  Discharge due to :    []  Other:      Therapist: Simran Bruno PTA    Date: 12/18/2017 Time: 3:17 PM     Future Appointments  Date Time Provider Medhat Portillo   [facility]2017 [facility]   12/27/2017 11:00 AM Matt Moeller PT [facility]   12/29/2017 11:00 AM Pedro Johnson [facility]   3/22/2018 11:00 AM [facility] NURSE [facility] NICOLETTE HART   9/19/2018 11:15 AM Gwen Moeller MD [id number] [facility]

## 2017-12-20 ENCOUNTER — HOSPITAL ENCOUNTER (OUTPATIENT)
Dept: PHYSICAL THERAPY | Age: 82
Discharge: HOME | End: 2017-12-20
Payer: MEDICARE

## 2017-12-20 PROCEDURE — 97140 MANUAL THERAPY 1/> REGIONS: CPT

## 2017-12-20 PROCEDURE — 97530 THERAPEUTIC ACTIVITIES: CPT

## 2017-12-20 PROCEDURE — 97110 THERAPEUTIC EXERCISES: CPT

## 2017-12-20 NOTE — PROGRESS NOTES
PHYSICAL THERAPY - DAILY TREATMENT NOTE    Patient Name: Annie Cameron        Date: 2017  : 1923   YES Patient  Verified  Visit #:     Insurance: Payor: Girish Dear / Plan: [place] MED[place] PART A & B / Product Type: Medicare /      In time: 2:05 Out time: 3:00   Total Treatment Time: 55     Medicare Time Tracking (below)   Total Timed Codes (min):  55 1:1 Treatment Time:  55     TREATMENT AREA =  Low back pain [M54.5]    SUBJECTIVE    Pain Level (on 0 to 10 scale):  0  / 10   Medication Changes/New allergies or changes in medical history, any new surgeries or procedures? NO    If yes, update Summary List   Subjective Functional Status/Changes:  []  No changes reported     Pt reports that his back hurts sometimes, but his stiff neck and knees hurt more. Pt's caregiver states she is unsure when he f/u with his MD regarding his back pain. She also reports that pt is able to get in and out of bed on his own at home. OBJECTIVE    30 min Therapeutic Exercise:  [x]  See flow sheet   Rationale:    Increase core strength/stabilization, ROM/flexibility to improve pt's ability to perform ADL's with increased ease and less pain to promote increased activity tolerance. 15 min Therapeutic Activity: sit<>stand without UE A from elevated mat     Rationale:    improve transfers and functional mobility    10 min Manual Therapy: MFR and IASTM to L T/S paraspinals   Rationale:  decrease pain, increase tissue extensibility and decrease trigger points to improve patient's ability to perform ADL's with greater ease and less pain. min Patient Education:  YES  Reviewed HEP   []  Progressed/Changed HEP based on: Other Objective/Functional Measures:    Sup>sit, SBA and cueing for movement pattern   sit>sup min A for trunk     Mod VCing for posture with seated ex. Pt able to perform sit<>stand from elevated mat without UE A, required use of AD in stance for balance.      No c/o pain during manual. Pt with intermittent c/o hip and back pain with bed mobility, but no overall increase in pain level at end of session. Post Treatment Pain Level (on 0 to 10) scale:   0  / 10     ASSESSMENT    Assessment/Changes in Function:     Pt continues with decreased functional mobility as evidenced by difficulty with transfers and bed mobility. []  See Progress Note/Recertification   Patient will continue to benefit from skilled PT services to modify and progress therapeutic interventions, address functional mobility deficits, address ROM deficits, address strength deficits, analyze and address soft tissue restrictions, analyze and cue movement patterns, analyze and modify body mechanics/ergonomics, assess and modify postural abnormalities, address imbalance/dizziness and instruct in home and community integration to attain remaining goals. Progress toward goals / Updated goals:    STGs- 4 wks - 12/27/17  1. Pt and caregiver will be safe and independent with HEP for participation in his rehabilitation. Pt's caregiver reports compliance with HEP  2. Pt will perform bed mobility with Jose Carlos Sup>sit, SBA and sit>sup min A for trunk   3. Pt will perform sit<>stand and stand<>pivot Xfers with Jose Carlos cont transfer training  LTGs- 12 wks - 2/21/18  1. Pt will be SBA with rollator to return to personal exercise program  2. Pt will be safe and indep with bed mobility to assist in his own care. 3. Pt will be safe and indep with Xfers to assist in his own care. 4. Pt will score > 48 on FOTO functional status survey representing overall functional improvements.      PLAN    []  Upgrade activities as tolerated YES Continue plan of care   []  Discharge due to :    []  Other:      Therapist: Jay Miguel PTA    Date: 12/20/2017 Time: 3:41 PM     Future Appointments  Date Time Provider Medhat Portillo   12/27/2017 11:00 AM Diana Bridges PT [facility] AT [facility]   12/29/2017 11:00 AM [facility] 3/22/2018 11:00 AM [facility] NURSE [facility] NICOLETTE SCHED   9/19/2018 11:15 AM Leopold Nickels, MD [address]

## 2017-12-27 ENCOUNTER — HOSPITAL ENCOUNTER (OUTPATIENT)
Dept: PHYSICAL THERAPY | Age: 82
Discharge: HOME | End: 2017-12-27
Payer: MEDICARE

## 2017-12-27 PROCEDURE — G8978 MOBILITY CURRENT STATUS: HCPCS

## 2017-12-27 PROCEDURE — 97110 THERAPEUTIC EXERCISES: CPT

## 2017-12-27 PROCEDURE — G8979 MOBILITY GOAL STATUS: HCPCS

## 2017-12-27 PROCEDURE — 97530 THERAPEUTIC ACTIVITIES: CPT

## 2017-12-27 NOTE — PROGRESS NOTES
PHYSICAL THERAPY - DAILY TREATMENT NOTE    Patient Name: Abhinav Adjutant        Date: 2017  : 1923   YES Patient  Verified  Visit #:   10   of   12  Insurance: Payor: Quoc Hardy / Plan: [place] MEDICARE PART A & B / Product Type: Medicare /      In time: 11:00 Out time: 12:00   Total Treatment Time: 60     Medicare Time Tracking (below)   Total Timed Codes (min):  60 1:1 Treatment Time:  60     TREATMENT AREA =  Low back pain [M54.5]  SUBJECTIVE    Pain Level (on 0 to 10 scale):  0  / 10   Medication Changes/New allergies or changes in medical history, any new surgeries or procedures? NO    If yes, update Summary List   Subjective Functional Status/Changes:  []  No changes reported     Pt reports intermittent neck, B shoulder, back and B knee pain; denies pain at this time. Pt states that he feels that he is almost back to normal (75%). Pt states that certain things that he has to do still hurt his back, reaching in any direction (up/down/sideways) or turning the wrong the way. Pt reports that his strength and balance seem to be back to normal.  Caregiver reports partial compliance with HEP. Caregiver reports that when pt when to gym, he used weight machines with light weights and treadmill/stationary bike. Caregiver reports that they will wait until f/u with MD after discharged from PT prior to resuming exercise at gym.        OBJECTIVE    45 min Therapeutic Exercise:  [x]  See flow sheet   Rationale:      increase ROM, increase strength, improve coordination, improve balance and increase proprioception to improve the patients ability to perform ADLs/IADLs, functional mobility and gait safely and independently without increased pain/symptoms     15 min Therapeutic Activity: Dominic MOLINA   Rationale: assess ADL/IADL function, functional mobility and gait to improve the patient's ability to perform ADLs/IADLs, functional mobility and gait safely and independently without increased pain/symptoms      During TE min Patient Education:  YES  Reviewed HEP   [x]  Progressed/Changed HEP based on:   Provided updated HEP; reviewed with pt and caregiver; advised pt and caregiver that pt should avoid TM due to balance deficits     Other Objective/Functional Measures:    Exercises per flowsheet    Tinetti = 8/28    FOTO = 38/100    LE strength:  B hip flex = 4-/5  B knee ext = 4/5  B knee flex = 4/5  B ankle DF = 4/5  B ankle PF = 4/5    L/S flex ~ 50%  L/S ext ~ 25%  L/S R rot ~ 50% (L L/S pain)  L/S L rot ~ 50%  L/S R SB ~ 50% (L L/S pain)  L/S L SB ~ 50% (R L/S pain)     Post Treatment Pain Level (on 0 to 10) scale:   0  / 10     ASSESSMENT    Assessment/Changes in Function:     See progress note     []  See Progress Note/Recertification   Patient will continue to benefit from skilled PT services: see progress note   Progress toward goals / Updated goals:    See progress note     PLAN    [x]  Upgrade activities as tolerated YES Continue plan of care   []  Discharge due to :    []  Other:      Therapist: Blossom Walter, PT    Date: 12/27/2017 Time: 11:00 AM     Future Appointments  Date Time Provider Medhat Portillo   12/29/2017 11:00 AM Elizabeth Camarena [facility]   3/22/2018 11:00 AM [facility] NURSE [facility] NICOLETTE HART   9/19/2018 11:15 AM Yesika Beyer MD [address]

## 2017-12-27 NOTE — PROGRESS NOTES
[address] [facility] PHYSICAL THERAPY  [address], [address] - Phone: [phone]  Fax: [phone]  Ant          Patient Name: Pat Landeros : 1923   Treatment/Medical Diagnosis: Low back pain [M54.5], gait instability [R26.81]   Onset Date: 2017    Referral Source: Yaneth Coreas MD Start of [facility]): 2017   Prior Hospitalization: See Medical History Provider #: [id number]   Prior Level of Function: Modified independent with rollator walker; gym program at [facility] 4x/week   Comorbidities: Fx, visually impaired, [place], OA, [place] , dowager's hump, COPD, glaucoma   Medications: Verified on Patient Summary List   Visits from [facility]: 10 Missed Visits: 0     Goal/Measure of Progress Goal Met? 1.  Pt and caregiver will be safe and independent with HEP for participation in his rehabilitation. Status at last Eval: NA Current Status: Caregiver reports partial compliance with HEP Progressing toward goal   2. Pt will perform bed mobility with Jose Carlos   Status at last Eval: Mod A Current Status: Sit to supine with S, rolling/scooting with S, supine to sit with S to min A yes   3. Pt will perform sit<>stand and stand<>pivot Xfers with Jose Carlos   Status at last Eval: Mod A Current Status: Sit to stand from chair or elevated mat with B UE assist with S, sit to stand from low mat with B UE assist with min A; stand pivot transfer with S with rollator walker yes     Key Functional Changes/Progress: Patient has progressed well with exercises in clinic, caregiver reports partial compliance with HEP. HEP was updated and reviewed with caregiver this session. Patient continues to ambulate with rollator walker with S, with tendency to push rollator walker too far ahead of body (able to improve position of rollator walker with frequent verbal cues).   Patient transfers sit to stand from chair with B UE support with S, requires min A from low mat surface. Patient transfers sit to supine and rolls/scoots on mat with S, requires S to min A to transfer supine to sit. Tinetti = 8/28, indicating high risk of falling. FOTO = 38/100 (stage 3, moderate difficulty performing usual work or household activities), which is increased from 28/100 (stage 2, extreme difficulty performing usual work or houeshold activities), indicating improved function. LE strength not significantly changed with B hip flex = 4-/5 and B knee flex/ext, B ankle PF/DF = 4/5. L/S ROM remains limited with flex/rot/SB ~ 50% normal, ext ~ 25% normal; however, pain only with R rot (L L/S pain) and B SB (L L/S pain with R SB, R L/S pain with L SB), suggesting muscular origin. Patient and caregiver both state that they feel that patient is almost back to normal (75%), stating that strength and balance seem back to normal but patient still has pain with reaching and turning. Both state that they feel that patient is almost ready to return to his gym routine. Problem List: pain affecting function, decrease ROM, decrease strength, impaired gait/ balance, decrease ADL/ functional abilitiies, decrease activity tolerance, decrease flexibility/ joint mobility and decrease transfer abilities   Treatment Plan may include any combination of the following: Therapeutic exercise, Therapeutic activities, Neuromuscular re-education, Physical agent/modality, Gait/balance training, Manual therapy, Patient education, Functional mobility training, Home safety training and Stair training  Patient Goal(s) has been updated and includes:      Goals for this certification period include and are to be achieved in   1-3  weeks:  1. Patient will score greater than or equal to 12/28 on Tinetti to indicate improved balance and improved functional mobility/gait. 2. Patient will perform bed mobility mod I in preparation for household mobility.   3. Patient will perform transfers mod I in preparation for household mobility. 4. Patient will score greater than or equal to 48/100 on FOTO to indicate improved function. Frequency / Duration:   Patient to be seen   2-3   times per week for   1-3    weeks:  G-Codes (GP): Mobility [id number] Current  CL= 60-79%   Goal  CK= 40-59%. The severity rating is based on the FOTO Score    Assessments/Recommendations: Patient would benefit from continued skilled PT services to address decreased ROM, decreased strength, decreased balance, decreased activity tolerance and impaired functional mobility/gait to improve the patient's ability to perform ADLs/IADLs, functional mobility and gait safely and independently without increased pain/symptoms. If you have any questions/comments please contact us directly at [phone]. Thank you for allowing us to assist in the care of your patient. Therapist Signature: Elena Hassan PT Date: 59/80/5217   Certification Period:  Reporting Period: 11/29/2017-2/28/2018 11/29/2017-12/27/2017 Time: 2:26 PM   NOTE TO PHYSICIAN:  PLEASE COMPLETE THE ORDERS BELOW AND FAX TO   [facility]: [phone]. If you are unable to process this request in 24 hours please contact our office: [phone].    ___ I have read the above report and request that my patient continue as recommended.   ___ I have read the above report and request that my patient continue therapy with the following changes/special instructions: ________________________________________________   ___ I have read the above report and request that my patient be discharged from therapy.      Physician Signature:        Date:       Time:

## 2017-12-29 ENCOUNTER — HOSPITAL ENCOUNTER (OUTPATIENT)
Dept: PHYSICAL THERAPY | Age: 82
Discharge: HOME | End: 2017-12-29
Payer: MEDICARE

## 2017-12-29 PROCEDURE — 97140 MANUAL THERAPY 1/> REGIONS: CPT

## 2017-12-29 PROCEDURE — 97110 THERAPEUTIC EXERCISES: CPT

## 2017-12-29 NOTE — PROGRESS NOTES
PT DAILY TREATMENT NOTE     Patient Name: Cara Tello  Date:2017  : 1923  [x]  Patient  Verified  Payor: VA MEDICARE / Plan: [place] MEDICARE PART A & B / Product Type: Medicare /    In time:1100  Out time:1155  Total Treatment Time (min): 55    Medicare/Medicaid Total Timed Codes (min): 55  1:1 Treatment Time (min): 55     Visit #:     Treatment Area: Low back pain [M54.5]    SUBJECTIVE  Pain Level (0-10 scale): \"good\"  Any medication changes, allergies to medications, adverse drug reactions, diagnosis change, or new procedure performed?: [x] No    [] Yes (see summary sheet for update)  Subjective functional status/changes:   [] No changes reported  Patient caregiver reports compliance with HEP. OBJECTIVE    47 min Therapeutic Exercise [x]  See Flowsheet    Rationale: increase ROM, increase strength and improve coordination to improve the patients ability to perform sit<>stand transfers without pain increase or compensations. 8 min Manual Therapy [] STM/DTM     [] IASTM     [] Scar Massage  [] X-friction       [] PNF         [] PROM  [] Soft tissue mobz:   [] Joint mobz:    Rationale: decrease pain, increase ROM, increase tissue extensibility, decrease edema  and increase postural awareness to improve functional mobility to safely ambulate over uneven terrain in the community. Patient Education [x] Review HEP    [] Progressed/Changed HEP based on:   [] positioning   [] body mechanics     [] transfers      [] heat/ice application       Other Objective/Functional Measures:  Paraspinal protrusion max dimensions: SUP-INF= 10.5 cm. Med-Lat = 11 cm   Date SUP-INF (cm) MED-LAT (cm) Largest diagonal (cm)   Eval 17 10.5 11 14   17 8.5 6              Pain Level (0-10 scale) post treatment: \"better\" states \"no\" when asked if any pain. ASSESSMENT/Changes in Function: Patient tolerated treatment well. Providing good effort.        Patient will continue to benefit from skilled PT services to modify and progress therapeutic interventions, address functional mobility deficits, address ROM deficits, address strength deficits, analyze and address soft tissue restrictions, analyze and cue movement patterns, analyze and modify body mechanics/ergonomics, assess and modify postural abnormalities, address imbalance/dizziness and instruct in home and community integration to attain remaining goals. []  See Plan of Care  []  See Progress Note/ Recertification  []  See Discharge Summary         Progress towards goals / Updated goals:   STG - 1-3 wks - 1/17/18  1. Patient will score greater than or equal to 12/28 on Tinetti to indicate improved balance and improved functional mobility/gait. 2. Patient will perform bed mobility mod I in preparation for household mobility. progressing  3. Patient will perform transfers mod I in preparation for household mobility. 4. Patient will score greater than or equal to 48/100 on FOTO to indicate improv ed function.     PLAN  [x]  Continue plan of care  []  Upgrade activities as tolerated       []  Update interventions per flow sheet       []  Discharge due to:_  []  Other:_      Therapist:  Blanca Manley PT  Date:  12/29/2017  Time: 12:01 PM    Future Appointments  Date Time Provider Medhat Portillo   1/2/2018 1:30 PM Kim Kapoor PT [facility]   1/4/2018 2:30 PM [address] [place] [address]   1/8/2018 2:30 PM [address] [place] [address]   1/11/2018 3:30 PM Kim Kapoor PT [facility]   1/12/2018 3:00 PM Ángel Johnson [facility]   3/22/2018 11:00 AM [facility] NURSE [facility] NICOLETTE SCHED   9/19/2018 11:15 AM Elaine Loving MD [id number]

## 2018-01-02 ENCOUNTER — HOSPITAL ENCOUNTER (OUTPATIENT)
Dept: PHYSICAL THERAPY | Age: 83
Discharge: HOME | End: 2018-01-02
Payer: MEDICARE

## 2018-01-02 PROCEDURE — 97110 THERAPEUTIC EXERCISES: CPT

## 2018-01-02 NOTE — PROGRESS NOTES
PHYSICAL THERAPY - DAILY TREATMENT NOTE    Patient Name: Reyes Worrell        Date: 2018  : 1923   YES Patient  Verified  Visit #:     Insurance: Payor: Rosy Zendejas / Plan: [place] MEDICARE PART A & B / Product Type: Medicare /      In time: 1:45 Out time: 2:30   Total Treatment Time: 45     Medicare Time Tracking (below)   Total Timed Codes (min):  45 1:1 Treatment Time:  45     TREATMENT AREA =  Low back pain [M54.5]  SUBJECTIVE    Pain Level (on 0 to 10 scale):  0  / 10   Medication Changes/New allergies or changes in medical history, any new surgeries or procedures? NO    If yes, update Summary List   Subjective Functional Status/Changes:  []  No changes reported     Pt without complaints.           OBJECTIVE    45 min Therapeutic Exercise:  [x]  See flow sheet   Rationale:      increase ROM, increase strength, improve coordination, improve balance and increase proprioception to improve the patients ability to perform ADLs/IADLs, functional mobility and gait safely and independently without increased pain/symptoms     During TE min Patient Education:  YES  Reviewed HEP   []  Progressed/Changed HEP based on:   Cont HEP     Other Objective/Functional Measures:    Exercises per flowsheet  Transfers sit to stand with S from elevated surface, with min A for lower surface  Transfers supine to sit, sit to supine mod I  Rolls B mod I, scoots mod I     Post Treatment Pain Level (on 0 to 10) scale:   0  / 10     ASSESSMENT    Assessment/Changes in Function:     Tolerated exercises without complaints     []  See Progress Note/Recertification   Patient will continue to benefit from skilled PT services to modify and progress therapeutic interventions, address functional mobility deficits, address ROM deficits, address strength deficits, analyze and address soft tissue restrictions, analyze and cue movement patterns, analyze and modify body mechanics/ergonomics, assess and modify postural abnormalities, address imbalance/dizziness and instruct in home and community integration to attain remaining goals. Progress toward goals / Updated goals:    Progressing slowly toward goals:  1. Patient will score greater than or equal to 12/28 on Tinetti to indicate improved balance and improved functional mobility/gait. 2. Patient will perform bed mobility mod I in preparation for household mobility. Progressing  3. Patient will perform transfers mod I in preparation for household mobility. Progressing  4. Patient will score greater than or equal to 48/100 on FOTO to indicate improv ed function.        PLAN    [x]  Upgrade activities as tolerated YES Continue plan of care   []  Discharge due to :    []  Other:      Therapist: Rock Steele PT    Date: 1/2/2018 Time: 1:43 PM     Future Appointments  Date Time Provider Medhat Portillo   1/4/2018 2:30 PM [address] [place] [address]   1/8/2018 2:30 PM [address] [place] [address]   1/11/2018 3:30 PM Rock Steele PT [facility]   1/12/2018 3:00 PM Alessandro Johnson [facility]   3/22/2018 11:00 AM [facility] NURSE [facility] NICOLETTE SCHED   9/19/2018 11:15 AM Nahid Wallace MD [address]

## 2018-01-11 ENCOUNTER — HOSPITAL ENCOUNTER (OUTPATIENT)
Dept: PHYSICAL THERAPY | Age: 83
Discharge: HOME | End: 2018-01-11
Payer: MEDICARE

## 2018-01-11 PROCEDURE — 97110 THERAPEUTIC EXERCISES: CPT

## 2018-01-11 NOTE — PROGRESS NOTES
PHYSICAL THERAPY - DAILY TREATMENT NOTE    Patient Name: Jaime Sahu        Date: 2018  : 1923   YES Patient  Verified  Visit #:     Insurance: Payor: Sia Daley / Plan: VA MEDICARE PART A & B / Product Type: Medicare /      In time: 3:40 Out time: 4:25   Total Treatment Time: 45     Medicare Time Tracking (below)   Total Timed Codes (min):  45 1:1 Treatment Time:  45     TREATMENT AREA =  Low back pain [M54.5]  SUBJECTIVE    Pain Level (on 0 to 10 scale):  3  / 10   Medication Changes/New allergies or changes in medical history, any new surgeries or procedures? NO    If yes, update Summary List   Subjective Functional Status/Changes:  []  No changes reported     Pt c/o pain \"all over\" from being stiff due to not moving around much.           OBJECTIVE    45 min Therapeutic Exercise:  [x]  See flow sheet   Rationale:      increase ROM, increase strength, improve coordination, improve balance and increase proprioception to improve the patients ability to perform ADLs/IADLs, functional mobility and gait safely and independently without increased pain/symptoms     During TE min Patient Education:  YES  Reviewed HEP   []  Progressed/Changed HEP based on:   Cont HEP     Other Objective/Functional Measures:    Exercises per flowsheet  Transfers sit to stand with S and increased time from chair, with min A from low mat  Transfers sit to supine with S and increased time, rolls with S and increased time, transfers supine to sit with min A at trunk and verbal cues for technique     Post Treatment Pain Level (on 0 to 10) scale:   0  / 10     ASSESSMENT    Assessment/Changes in Function:     Tolerated exercises without complaints     []  See Progress Note/Recertification   Patient will continue to benefit from skilled PT services to modify and progress therapeutic interventions, address functional mobility deficits, address ROM deficits, address strength deficits, analyze and address soft tissue restrictions, analyze and cue movement patterns, analyze and modify body mechanics/ergonomics, assess and modify postural abnormalities, address imbalance/dizziness and instruct in home and community integration to attain remaining goals. Progress toward goals / Updated goals:    Progressing toward goals:  1. Patient will score greater than or equal to 12/28 on Tinetti to indicate improved balance and improved functional mobility/gait. 2. Patient will perform bed mobility mod I in preparation for household mobility. Progressing - S to min A  3. Patient will perform transfers mod I in preparation for household mobility. Progressing - S to min A  4. Patient will score greater than or equal to 48/100 on FOTO to indicate improv ed function.        PLAN    [x]  Upgrade activities as tolerated YES Continue plan of care   []  Discharge due to :    []  Other:      Therapist: Jorgito Gallagher, PT    Date: 1/11/2018 Time: 3:46 PM     Future Appointments  Date Time Provider Medhat Portillo   1/12/2018 3:00 PM [facility] AT [facility]   3/22/2018 11:00 AM [facility] NURSE [facility] NICOLETTE SCHED   9/19/2018 11:15 AM Suzanne Hoffman MD [facility]

## 2018-01-12 ENCOUNTER — HOSPITAL ENCOUNTER (OUTPATIENT)
Dept: PHYSICAL THERAPY | Age: 83
Discharge: HOME | End: 2018-01-12
Payer: MEDICARE

## 2018-01-12 PROCEDURE — 97530 THERAPEUTIC ACTIVITIES: CPT

## 2018-01-12 PROCEDURE — G8980 MOBILITY D/C STATUS: HCPCS

## 2018-01-12 PROCEDURE — G8979 MOBILITY GOAL STATUS: HCPCS

## 2018-01-12 PROCEDURE — 97110 THERAPEUTIC EXERCISES: CPT

## 2018-01-12 NOTE — PROGRESS NOTES
[address] [facility] [address] THERAPY  [address] [facility], [address] Phone: [phone]. Fax: [phone]. PHYSICAL THERAPY DISCHARGE    Patient Name:   Nirav Rodriguez  :     1923  Diagnosis:    Low back pain [M54.5]  Onset Date:  17  Referral Source: Falguni Lynn MD  Provider #:  [id number]  Start of Care:  17       Attended Visits: 14  Missed Visits:  2    Key Functional Changes / Progress:  89 yo WM with CC low back pain X 3 wks s/p fall. Bent over to  milk container, and fell forward in flexion. CT indicated + nondisplaced fxs of L 12th post rib, L1-l4 transverse processes. Myofascial paramedian soft tissue lesion from T12-L2: dimensions 8.4 x 3.3 x 7 cm. (Surface measurements in table below). Patient's POC has consisted of therex, therapeutic activities, manual therapy prn, modalities prn, pt. education, and a comprehensive HEP. Treatment strategies used to address functional mobility deficits, ROM deficits, strength deficits, analyze and address soft tissue restrictions, analyze and cue movement patterns, analyze and modify body mechanics/ergonomics, assess and modify postural abnormalities and instruct in home and community integration. Patient education was also provided to accompanying commercial caregivers. At discharge, patient is safe and modified independent supervised with rollator walker, bed mobility, and sit<>stand Xfers, and he should be able to resume his previous \"senior workout\" program at the senior center if accompanied by caregiver/sitter. Trunk ROM: 75% without pain. MMT: technically 4/5 based on standard positions and ability to lift body against gravity. However, considering his age and underlying medical HX, I consider him WNL.         Other Objective/Functional Measures:    Paraspinal protrusion max dimensions:             Date SUP-INF (cm) MED-LAT (cm) Largest diagonal (cm)   Eval 17 10.5 11 14 12/29/17 8.5 6      1/12/18 4.5 4 6         FOTO: Focus on Therapeutic Outcomes Survey  Initial Eval: 20  Predicted DC: 48  12/27/2017 38  1/12/2018:   69    Previous Goals/ measure of progress:  STG - 1-3 wks - 1/17/18  1. Patient will score greater than or equal to 12/28 on Tinetti to indicate improved balance and improved functional mobility/gait. MET  2. Patient will perform bed mobility mod I in preparation for household mobility. MET  3. Patient will perform transfers mod I in preparation for household mobility. MET  4. Patient will score greater than or equal to 48/100 on FOTO to indicate improv ed function. 69 = MET     G-Codes:  Mobility   Goal  CK= 40-59%  D/C  CJ= 20-39%. The severity rating is based on the FOTO Score      Recommendations:  Discontinue therapy. Progressing towards or have reached established goals. If you have any questions/comments please contact us directly at [phone]  Thank you for allowing us to assist in the care of your patient. Therapist Signature: Reyes Johnson, PT  Date:  1/12/2018  Time:  5:62 PM  Certification Period: 11/29/17 - 2/28/18  Reporting Period: 11/29/17 - 1/12/18  ______________________________________________________________________    NOTE TO PHYSICIAN: PLEASE COMPLETE THE ORDERS BELOW AND FAX TO: [facility]: [phone]  If you are unable to process this request in 24 hours, please contact our office: [phone]    _____  I have read the above and request that my patient be discharged from therapy.       Physician Signature:________________________ Date: ________ Time: _________

## 2018-01-12 NOTE — PROGRESS NOTES
PT DAILY TREATMENT NOTE     Patient Name: Edin Valverde  Date:2018  : 1923  [x]  Patient  Verified  Payor: Shanti Offer / Plan: [place] & B / Product Type: Medicare /    In time:1500  Out time:154  Total Treatment Time (min): 45    Medicare/Medicaid Total Timed Codes (min): 45  1:1 Treatment Time (min): 45     Visit #: 14 of 19    Treatment Area: Low back pain [M54.5]    SUBJECTIVE  Pain Level (0-10 scale): 3  Any medication changes, allergies to medications, adverse drug reactions, diagnosis change, or new procedure performed?: [x] No    [] Yes (see summary sheet for update)  Subjective functional status/changes:   [] No changes reported  Patient reports compliance with HEP. Caregiver reports he is mod Indep with all ADLs including Xfers, household ambulation, dressing, etc.      OBJECTIVE    15 min Therapeutic Exercise [x]  See Flowsheet    Rationale: increase ROM, increase strength, improve coordination and improve balance to improve the patients ability to perform sit<>stand transfers without pain increase or compensations. 30 min Therapeutic Activity [x]  See Flowsheet    Rationale: increase ROM, increase strength, improve coordination, improve balance and increase proprioception to improve the patients ability to perform sit<>stand transfers without pain increase or compensations. Patient Education [x] Review HEP    [] Progressed/Changed HEP based on:   [] positioning   [] body mechanics     [] transfers      [] heat/ice application       Other Objective/Functional Measures:  See DC summ     Pain Level (0-10 scale) post treatment: 0    ASSESSMENT/Changes in Function: Patient tolerated treatment well. Providing good effort. Patient has progressed well. He is currently mod I with all Xfers and ambulation within our clinic. His pain has significantly diminished.  Instructed pt and caregiver to avoid  press and any sort of lifting from flexion like clean-jerks, but otherwise he can resume his previous senior exercise class with supervision. []  See Plan of Care  []  See Progress Note/ Recertification  [x]  See Discharge Summary         Progress towards goals / Updated goals:  STG - 1-3 wks - 1/17/18  1. Patient will score greater than or equal to 12/28 on Tinetti to indicate improved balance and improved functional mobility/gait. MET  2. Patient will perform bed mobility mod I in preparation for household mobility. MET  3. Patient will perform transfers mod I in preparation for household mobility. MET  4. Patient will score greater than or equal to 48/100 on FOTO to indicate improv ed function.  69 = MET       PLAN  []  Continue plan of care  []  Upgrade activities as tolerated       []  Update interventions per flow sheet       [x]  Discharge due to:_ ALL GOALS MET  []  Other:_      Therapist:  Lauren Ulloa PT  Date:  1/12/2018  Time: 3:23 PM    Future Appointments  Date Time Provider [place]   3/22/2018 11:00 AM [place] [id number] [place] [place]   9/19/2018 11:15 AM Violeta Cockayne, MD [place]

## 2018-08-31 ENCOUNTER — HOSPITAL ENCOUNTER (OUTPATIENT)
Dept: LAB | Age: 83
Discharge: HOME | End: 2018-08-31
Payer: MEDICARE

## 2018-08-31 DIAGNOSIS — Z91.89 PERSONAL HISTORY OF POISONING, PRESENTING HAZARDS TO HEALTH: ICD-10-CM

## 2018-08-31 DIAGNOSIS — R35.89 POLYURIA: ICD-10-CM

## 2018-08-31 LAB
ANION GAP SERPL CALC-SCNC: 8 MMOL/L (ref 3–18)
BUN SERPL-MCNC: 25 MG/DL (ref 7–18)
BUN/CREAT SERPL: 29 (ref 12–20)
CALCIUM SERPL-MCNC: 9.1 MG/DL (ref 8.5–10.1)
CHLORIDE SERPL-SCNC: 108 MMOL/L (ref 100–108)
CO2 SERPL-SCNC: 30 MMOL/L (ref 21–32)
CREAT SERPL-MCNC: 0.86 MG/DL (ref 0.6–1.3)
GLUCOSE SERPL-MCNC: 87 MG/DL (ref 74–99)
POTASSIUM SERPL-SCNC: 3.9 MMOL/L (ref 3.5–5.5)
SODIUM SERPL-SCNC: 146 MMOL/L (ref 136–145)

## 2018-08-31 PROCEDURE — 80048 BASIC METABOLIC PNL TOTAL CA: CPT | Performed by: INTERNAL MEDICINE

## 2018-08-31 PROCEDURE — 36415 COLL VENOUS BLD VENIPUNCTURE: CPT | Performed by: INTERNAL MEDICINE

## 2019-04-01 ENCOUNTER — HOSPITAL ENCOUNTER (OUTPATIENT)
Dept: GENERAL RADIOLOGY | Age: 84
Discharge: HOME | End: 2019-04-01
Payer: MEDICARE
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
## 2019-04-01 DIAGNOSIS — R52 PAIN: ICD-10-CM

## 2019-04-01 PROCEDURE — 73110 X-RAY EXAM OF WRIST: CPT

## 2020-09-01 ENCOUNTER — HOSPITAL ENCOUNTER (OUTPATIENT)
Dept: GENERAL RADIOLOGY | Age: 85
Discharge: HOME | End: 2020-09-01
Payer: MEDICARE
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
## 2020-09-01 DIAGNOSIS — T14.8XXA BRUISE: ICD-10-CM

## 2020-09-01 DIAGNOSIS — R52 PAIN: ICD-10-CM

## 2020-09-01 PROCEDURE — 73030 X-RAY EXAM OF SHOULDER: CPT

## 2020-09-01 PROCEDURE — 72050 X-RAY EXAM NECK SPINE 4/5VWS: CPT

## 2020-09-01 PROCEDURE — 73000 X-RAY EXAM OF COLLAR BONE: CPT

## 2021-01-20 PROBLEM — H61.20 IMPACTED CERUMEN: Status: ACTIVE | Noted: 2021-01-20

## 2021-01-20 PROBLEM — H60.399 CHRONIC INFECTIVE OTITIS EXTERNA: Status: ACTIVE | Noted: 2021-01-20

## 2021-01-20 PROBLEM — H91.90 HEARING LOSS: Status: ACTIVE | Noted: 2021-01-20

## 2021-03-25 ENCOUNTER — TRANSCRIBE ORDER (OUTPATIENT)
Dept: REGISTRATION | Age: 86
End: 2021-03-25

## 2021-03-25 ENCOUNTER — HOSPITAL ENCOUNTER (OUTPATIENT)
Dept: GENERAL RADIOLOGY | Age: 86
Discharge: HOME | End: 2021-03-25
Attending: INTERNAL MEDICINE
Payer: MEDICARE

## 2021-03-25 DIAGNOSIS — M54.41 LOW BACK PAIN WITH RIGHT-SIDED SCIATICA, UNSPECIFIED BACK PAIN LATERALITY, UNSPECIFIED CHRONICITY: ICD-10-CM

## 2021-03-25 DIAGNOSIS — R05.9 COUGH: ICD-10-CM

## 2021-03-25 DIAGNOSIS — R05.9 COUGH: Primary | ICD-10-CM

## 2021-03-25 PROCEDURE — 72110 X-RAY EXAM L-2 SPINE 4/>VWS: CPT

## 2021-03-25 PROCEDURE — 71046 X-RAY EXAM CHEST 2 VIEWS: CPT

## 2021-04-03 ENCOUNTER — HOME HEALTH ADMISSION (OUTPATIENT)
Dept: HOME HEALTH SERVICES | Facility: HOME HEALTH | Age: 86
End: 2021-04-03
Payer: MEDICARE

## 2021-04-04 ENCOUNTER — HOME CARE VISIT (OUTPATIENT)
Dept: SCHEDULING | Facility: HOME HEALTH | Age: 86
End: 2021-04-04
Payer: MEDICARE

## 2021-04-04 VITALS
TEMPERATURE: 97.5 F | OXYGEN SATURATION: 97 % | RESPIRATION RATE: 17 BRPM | DIASTOLIC BLOOD PRESSURE: 70 MMHG | HEART RATE: 78 BPM | SYSTOLIC BLOOD PRESSURE: 120 MMHG

## 2021-04-04 PROCEDURE — G0151 HHCP-SERV OF PT,EA 15 MIN: HCPCS

## 2021-04-04 PROCEDURE — 3331090001 HH PPS REVENUE CREDIT

## 2021-04-04 PROCEDURE — 400018 HH-NO PAY CLAIM PROCEDURE

## 2021-04-04 PROCEDURE — 400013 HH SOC

## 2021-04-04 PROCEDURE — 3331090002 HH PPS REVENUE DEBIT

## 2021-04-05 ENCOUNTER — HOME CARE VISIT (OUTPATIENT)
Dept: SCHEDULING | Facility: HOME HEALTH | Age: 86
End: 2021-04-05
Payer: MEDICARE

## 2021-04-05 ENCOUNTER — HOME CARE VISIT (OUTPATIENT)
Dept: HOME HEALTH SERVICES | Facility: HOME HEALTH | Age: 86
End: 2021-04-05
Payer: MEDICARE

## 2021-04-05 VITALS
TEMPERATURE: 98.2 F | HEART RATE: 61 BPM | DIASTOLIC BLOOD PRESSURE: 63 MMHG | SYSTOLIC BLOOD PRESSURE: 137 MMHG | RESPIRATION RATE: 18 BRPM | OXYGEN SATURATION: 96 %

## 2021-04-05 PROCEDURE — 3331090002 HH PPS REVENUE DEBIT

## 2021-04-05 PROCEDURE — 3331090001 HH PPS REVENUE CREDIT

## 2021-04-05 PROCEDURE — G0157 HHC PT ASSISTANT EA 15: HCPCS

## 2021-04-06 PROCEDURE — 3331090001 HH PPS REVENUE CREDIT

## 2021-04-06 PROCEDURE — 3331090002 HH PPS REVENUE DEBIT

## 2021-04-07 ENCOUNTER — HOME CARE VISIT (OUTPATIENT)
Dept: SCHEDULING | Facility: HOME HEALTH | Age: 86
End: 2021-04-07
Payer: MEDICARE

## 2021-04-07 VITALS
TEMPERATURE: 97.4 F | HEART RATE: 60 BPM | OXYGEN SATURATION: 95 % | DIASTOLIC BLOOD PRESSURE: 63 MMHG | SYSTOLIC BLOOD PRESSURE: 124 MMHG | RESPIRATION RATE: 19 BRPM

## 2021-04-07 PROCEDURE — 3331090001 HH PPS REVENUE CREDIT

## 2021-04-07 PROCEDURE — G0157 HHC PT ASSISTANT EA 15: HCPCS

## 2021-04-07 PROCEDURE — 3331090002 HH PPS REVENUE DEBIT

## 2021-04-08 PROCEDURE — 3331090002 HH PPS REVENUE DEBIT

## 2021-04-08 PROCEDURE — 3331090001 HH PPS REVENUE CREDIT

## 2021-04-09 PROCEDURE — 3331090002 HH PPS REVENUE DEBIT

## 2021-04-09 PROCEDURE — 3331090001 HH PPS REVENUE CREDIT

## 2021-04-10 PROCEDURE — 3331090001 HH PPS REVENUE CREDIT

## 2021-04-10 PROCEDURE — 3331090002 HH PPS REVENUE DEBIT

## 2021-04-11 PROCEDURE — 3331090001 HH PPS REVENUE CREDIT

## 2021-04-11 PROCEDURE — 3331090002 HH PPS REVENUE DEBIT

## 2021-04-12 ENCOUNTER — HOME CARE VISIT (OUTPATIENT)
Dept: SCHEDULING | Facility: HOME HEALTH | Age: 86
End: 2021-04-12
Payer: MEDICARE

## 2021-04-12 VITALS
TEMPERATURE: 97.4 F | HEART RATE: 64 BPM | SYSTOLIC BLOOD PRESSURE: 143 MMHG | RESPIRATION RATE: 18 BRPM | OXYGEN SATURATION: 98 % | DIASTOLIC BLOOD PRESSURE: 64 MMHG

## 2021-04-12 PROCEDURE — 3331090002 HH PPS REVENUE DEBIT

## 2021-04-12 PROCEDURE — G0157 HHC PT ASSISTANT EA 15: HCPCS

## 2021-04-12 PROCEDURE — 3331090001 HH PPS REVENUE CREDIT

## 2021-04-13 PROCEDURE — 3331090001 HH PPS REVENUE CREDIT

## 2021-04-13 PROCEDURE — 3331090002 HH PPS REVENUE DEBIT

## 2021-04-14 ENCOUNTER — HOME CARE VISIT (OUTPATIENT)
Dept: SCHEDULING | Facility: HOME HEALTH | Age: 86
End: 2021-04-14
Payer: MEDICARE

## 2021-04-14 VITALS
HEART RATE: 62 BPM | OXYGEN SATURATION: 94 % | RESPIRATION RATE: 19 BRPM | DIASTOLIC BLOOD PRESSURE: 64 MMHG | SYSTOLIC BLOOD PRESSURE: 126 MMHG | TEMPERATURE: 97.6 F

## 2021-04-14 PROCEDURE — G0157 HHC PT ASSISTANT EA 15: HCPCS

## 2021-04-14 PROCEDURE — 3331090001 HH PPS REVENUE CREDIT

## 2021-04-14 PROCEDURE — 3331090002 HH PPS REVENUE DEBIT

## 2021-04-15 PROCEDURE — 3331090002 HH PPS REVENUE DEBIT

## 2021-04-15 PROCEDURE — 3331090001 HH PPS REVENUE CREDIT

## 2021-04-16 PROCEDURE — 3331090001 HH PPS REVENUE CREDIT

## 2021-04-16 PROCEDURE — 3331090002 HH PPS REVENUE DEBIT

## 2021-04-17 PROCEDURE — 3331090002 HH PPS REVENUE DEBIT

## 2021-04-17 PROCEDURE — 3331090001 HH PPS REVENUE CREDIT

## 2021-04-18 PROCEDURE — 3331090002 HH PPS REVENUE DEBIT

## 2021-04-18 PROCEDURE — 3331090001 HH PPS REVENUE CREDIT

## 2021-04-19 ENCOUNTER — HOME CARE VISIT (OUTPATIENT)
Dept: SCHEDULING | Facility: HOME HEALTH | Age: 86
End: 2021-04-19
Payer: MEDICARE

## 2021-04-19 ENCOUNTER — HOME CARE VISIT (OUTPATIENT)
Dept: HOME HEALTH SERVICES | Facility: HOME HEALTH | Age: 86
End: 2021-04-19
Payer: MEDICARE

## 2021-04-19 PROCEDURE — G0151 HHCP-SERV OF PT,EA 15 MIN: HCPCS

## 2021-04-19 PROCEDURE — 3331090002 HH PPS REVENUE DEBIT

## 2021-04-19 PROCEDURE — 3331090001 HH PPS REVENUE CREDIT

## 2021-04-20 PROCEDURE — 3331090001 HH PPS REVENUE CREDIT

## 2021-04-20 PROCEDURE — 3331090002 HH PPS REVENUE DEBIT

## 2021-04-21 ENCOUNTER — HOME CARE VISIT (OUTPATIENT)
Dept: SCHEDULING | Facility: HOME HEALTH | Age: 86
End: 2021-04-21
Payer: MEDICARE

## 2021-04-21 VITALS
DIASTOLIC BLOOD PRESSURE: 64 MMHG | TEMPERATURE: 98.1 F | HEART RATE: 60 BPM | SYSTOLIC BLOOD PRESSURE: 123 MMHG | RESPIRATION RATE: 18 BRPM | OXYGEN SATURATION: 93 %

## 2021-04-21 PROCEDURE — 3331090001 HH PPS REVENUE CREDIT

## 2021-04-21 PROCEDURE — G0157 HHC PT ASSISTANT EA 15: HCPCS

## 2021-04-21 PROCEDURE — 3331090002 HH PPS REVENUE DEBIT

## 2021-04-22 PROCEDURE — 3331090001 HH PPS REVENUE CREDIT

## 2021-04-22 PROCEDURE — 3331090002 HH PPS REVENUE DEBIT

## 2021-04-23 VITALS
HEART RATE: 56 BPM | TEMPERATURE: 97.5 F | OXYGEN SATURATION: 94 % | DIASTOLIC BLOOD PRESSURE: 64 MMHG | RESPIRATION RATE: 18 BRPM | SYSTOLIC BLOOD PRESSURE: 132 MMHG

## 2021-04-23 PROCEDURE — 3331090001 HH PPS REVENUE CREDIT

## 2021-04-23 PROCEDURE — 3331090002 HH PPS REVENUE DEBIT

## 2021-04-24 PROCEDURE — 3331090002 HH PPS REVENUE DEBIT

## 2021-04-24 PROCEDURE — 3331090001 HH PPS REVENUE CREDIT

## 2021-04-25 PROCEDURE — 3331090001 HH PPS REVENUE CREDIT

## 2021-04-25 PROCEDURE — 3331090002 HH PPS REVENUE DEBIT

## 2021-04-26 ENCOUNTER — HOME CARE VISIT (OUTPATIENT)
Dept: SCHEDULING | Facility: HOME HEALTH | Age: 86
End: 2021-04-26
Payer: MEDICARE

## 2021-04-26 VITALS
TEMPERATURE: 97.7 F | HEART RATE: 63 BPM | DIASTOLIC BLOOD PRESSURE: 63 MMHG | SYSTOLIC BLOOD PRESSURE: 138 MMHG | RESPIRATION RATE: 91 BRPM | OXYGEN SATURATION: 93 %

## 2021-04-26 PROCEDURE — G0157 HHC PT ASSISTANT EA 15: HCPCS

## 2021-04-26 PROCEDURE — 3331090002 HH PPS REVENUE DEBIT

## 2021-04-26 PROCEDURE — 3331090001 HH PPS REVENUE CREDIT

## 2021-04-27 PROCEDURE — 3331090002 HH PPS REVENUE DEBIT

## 2021-04-27 PROCEDURE — 3331090001 HH PPS REVENUE CREDIT

## 2021-04-28 ENCOUNTER — HOME CARE VISIT (OUTPATIENT)
Dept: SCHEDULING | Facility: HOME HEALTH | Age: 86
End: 2021-04-28
Payer: MEDICARE

## 2021-04-28 VITALS
SYSTOLIC BLOOD PRESSURE: 137 MMHG | DIASTOLIC BLOOD PRESSURE: 66 MMHG | HEART RATE: 69 BPM | RESPIRATION RATE: 18 BRPM | OXYGEN SATURATION: 96 % | TEMPERATURE: 98.3 F

## 2021-04-28 PROCEDURE — 3331090001 HH PPS REVENUE CREDIT

## 2021-04-28 PROCEDURE — 3331090002 HH PPS REVENUE DEBIT

## 2021-04-28 PROCEDURE — G0157 HHC PT ASSISTANT EA 15: HCPCS

## 2021-04-29 PROCEDURE — 3331090002 HH PPS REVENUE DEBIT

## 2021-04-29 PROCEDURE — 3331090001 HH PPS REVENUE CREDIT

## 2021-04-30 PROCEDURE — 3331090002 HH PPS REVENUE DEBIT

## 2021-04-30 PROCEDURE — 3331090001 HH PPS REVENUE CREDIT

## 2021-05-01 PROCEDURE — 3331090001 HH PPS REVENUE CREDIT

## 2021-05-01 PROCEDURE — 3331090002 HH PPS REVENUE DEBIT

## 2021-05-02 PROCEDURE — 3331090002 HH PPS REVENUE DEBIT

## 2021-05-02 PROCEDURE — 3331090001 HH PPS REVENUE CREDIT

## 2021-05-03 ENCOUNTER — HOME CARE VISIT (OUTPATIENT)
Dept: SCHEDULING | Facility: HOME HEALTH | Age: 86
End: 2021-05-03
Payer: MEDICARE

## 2021-05-03 PROCEDURE — G0151 HHCP-SERV OF PT,EA 15 MIN: HCPCS

## 2021-05-03 PROCEDURE — 3331090002 HH PPS REVENUE DEBIT

## 2021-05-03 PROCEDURE — 3331090001 HH PPS REVENUE CREDIT

## 2021-05-04 VITALS
DIASTOLIC BLOOD PRESSURE: 82 MMHG | SYSTOLIC BLOOD PRESSURE: 141 MMHG | OXYGEN SATURATION: 96 % | TEMPERATURE: 95.3 F | HEART RATE: 64 BPM

## 2021-05-04 PROCEDURE — 400018 HH-NO PAY CLAIM PROCEDURE
